# Patient Record
Sex: MALE | Race: WHITE | HISPANIC OR LATINO
[De-identification: names, ages, dates, MRNs, and addresses within clinical notes are randomized per-mention and may not be internally consistent; named-entity substitution may affect disease eponyms.]

---

## 2017-03-02 ENCOUNTER — APPOINTMENT (OUTPATIENT)
Dept: GASTROENTEROLOGY | Facility: CLINIC | Age: 37
End: 2017-03-02

## 2017-03-02 VITALS
OXYGEN SATURATION: 99 % | TEMPERATURE: 98.7 F | HEIGHT: 74 IN | WEIGHT: 228 LBS | SYSTOLIC BLOOD PRESSURE: 120 MMHG | HEART RATE: 68 BPM | DIASTOLIC BLOOD PRESSURE: 70 MMHG | BODY MASS INDEX: 29.26 KG/M2

## 2017-03-02 DIAGNOSIS — Z86.19 PERSONAL HISTORY OF OTHER INFECTIOUS AND PARASITIC DISEASES: ICD-10-CM

## 2017-03-02 DIAGNOSIS — R07.9 CHEST PAIN, UNSPECIFIED: ICD-10-CM

## 2017-03-02 DIAGNOSIS — K21.9 GASTRO-ESOPHAGEAL REFLUX DISEASE W/OUT ESOPHAGITIS: ICD-10-CM

## 2017-03-02 DIAGNOSIS — R10.13 EPIGASTRIC PAIN: ICD-10-CM

## 2017-04-20 ENCOUNTER — APPOINTMENT (OUTPATIENT)
Dept: GASTROENTEROLOGY | Facility: CLINIC | Age: 37
End: 2017-04-20

## 2017-06-15 RX ORDER — PANTOPRAZOLE 40 MG/1
40 TABLET, DELAYED RELEASE ORAL TWICE DAILY
Qty: 60 | Refills: 10 | Status: ACTIVE | COMMUNITY
Start: 2017-03-02 | End: 1900-01-01

## 2020-12-03 ENCOUNTER — EMERGENCY (EMERGENCY)
Facility: HOSPITAL | Age: 40
LOS: 1 days | Discharge: HOME | End: 2020-12-03
Attending: EMERGENCY MEDICINE | Admitting: EMERGENCY MEDICINE
Payer: COMMERCIAL

## 2020-12-03 VITALS
RESPIRATION RATE: 18 BRPM | DIASTOLIC BLOOD PRESSURE: 90 MMHG | HEART RATE: 86 BPM | SYSTOLIC BLOOD PRESSURE: 141 MMHG | TEMPERATURE: 98 F | WEIGHT: 225.09 LBS | HEIGHT: 74 IN | OXYGEN SATURATION: 98 %

## 2020-12-03 DIAGNOSIS — Z20.828 CONTACT WITH AND (SUSPECTED) EXPOSURE TO OTHER VIRAL COMMUNICABLE DISEASES: ICD-10-CM

## 2020-12-03 PROCEDURE — U0003: CPT

## 2020-12-03 PROCEDURE — 99283 EMERGENCY DEPT VISIT LOW MDM: CPT

## 2020-12-03 NOTE — ED PROVIDER NOTE - CONSTITUTIONAL NEGATIVE STATEMENT, MLM
+nausea. No vomiting. Last emesis was last night. Dad reports urgent care checked pt's urine   no fever and no chills.

## 2020-12-03 NOTE — ED ADULT NURSE NOTE - NSIMPLEMENTINTERV_GEN_ALL_ED
Implemented All Universal Safety Interventions:  [place] to call system. Call bell, personal items and telephone within reach. Instruct patient to call for assistance. Room bathroom lighting operational. Non-slip footwear when patient is off stretcher. Physically safe environment: no spills, clutter or unnecessary equipment. Stretcher in lowest position, wheels locked, appropriate side rails in place.

## 2020-12-03 NOTE — ED PROVIDER NOTE - PATIENT PORTAL LINK FT
You can access the FollowMyHealth Patient Portal offered by [facility] by registering at the following website: [email or web address]. By joining [employer]’s FollowMyHealth portal, you will also be able to view your health information using other applications (apps) compatible with our system.

## 2020-12-03 NOTE — ED PROVIDER NOTE - CLINICAL SUMMARY MEDICAL DECISION MAKING FREE TEXT BOX
pt presented for COVID testing and had no symptoms, pt was tested and given instruction about covid testin

## 2020-12-03 NOTE — ED ADULT TRIAGE NOTE - CHIEF COMPLAINT QUOTE
Patient presents to ED requesting covid swab because his father is experiencing symptoms (father also triaged at this time.)

## 2020-12-03 NOTE — ED PROVIDER NOTE - OBJECTIVE STATEMENT
41 y/o male with no sig PMHX presents to ED requesting COVID testing as his father has emigrated for other country few days ago and now he has cough and fever and he thinks his father may have COVID and wants him and his father both tested for COVID, he denies any symptoms

## 2020-12-03 NOTE — ED PROVIDER NOTE - NSFOLLOWUPINSTRUCTIONS_ED_ALL_ED_FT
1. You were seen in the ED and underwent testing for the novel coronarvirus (COVID-19). The results are not back yet and you will be contacted with the result in 5-7 days, but may take longer. You were also tested for other common viruses such as the flu and cold viruses. You will be notified if you test positive for any of these.    2. Until your test results are back, YOU MUST SELF-QUARANTINE until you are told to other otherwise by [facility] or the local [facility] department. This is extremely important to limit the spread of this virus. Please refer closely to the packet provided to you on the specifics of the process of self-quarantine.    3. If you end up testing positive for the virus, you will instructed as to when you can return to your usual activities. If you do not hear from anyone in 7 days, call [phone].     4. Return to the ED for difficulty breathing.    5. You may take over the counter acetaminophen (Tylenol) 650mg every 6 hours as needed for fever or pain. There is some concern in the medical community about using ibuprofen (Advil, Motrin) and other NSAIDs in people with COVID infections and until there is more research on this subject it may be best to avoid NSAIDs like ibuprofen, unless you have an allergy to acetaminophen (Tylenol).  Do NOT exceed 3500mg acetaminophen in 24 hours.  Please do not take these medications if you do not have pain or fever or if you have any history of liver disease.     -------------    What is a coronavirus?  Coronaviruses are a large family of viruses that cause illnesses ranging from the common cold to more severe diseases such as Middle East Respiratory Syndrome (MERS) and Severe Acute Respiratory Syndrome (SARS).    What is Novel Coronavirus (COVID-19)?  The Centers for Disease Control and Prevention (CDC) is closely monitoring the outbreak caused by COVID-19. For the latest information about COVID-19, visit the CDC website at CDC.gov/Coronavirus    How are coronaviruses spread?  Coronaviruses can be transmitted from person-toperson, usually after close contact with an infected  person (for example, in a household, workplace, or healthcare setting), via droplets that become airborne after a cough or sneeze. These droplets can then infect a nearby person. Transmission can also occur by touching recently contaminated surfaces.    Is there a treatment for a COVID-19?  There is no specific treatment for disease caused by COVID-19. However, many of the symptoms can be treated based on the patient’s clinical condition. Supportive care for infected persons can be highly effective.    What are the symptoms of coronavirus infection?  It depends on the virus, but common signs include fever and/or respiratory symptoms such as cough and shortness of breath. In more severe cases, infection can cause pneumonia, severe acute respiratory syndrome, kidney failure and even death. Fortunately, most cases of COVID-19 have an illness no different than the influenza (flu), with a majority of these patients having mild symptoms and overall mortality which appears to be not much different than the flu.    What can I do to protect myself?  The best precautionary measures:  – washing your hands  – covering your cough  – disinfecting surfaces  – it is also advisable to avoid close contact with anyone showing symptoms of respiratory illness such as coughing and sneezing  – those with symptoms should wear a surgical mask when around others    What can I do to protect those around me?  If you have been identified as someone who may be infected with COVID-19, we recommend you follow the self-isolation procedures outlined on the following page to protect those around you and to limit the spread of this virus.    We recommend the below precautionary steps from now until 14 days from when you returned from your travel or date of your last known possible contact:    — Do not go to work, school or public areas. Avoid using public transportation, ridesharing or taxis.  — As much as possible, separate yourself from other people in your home. If you can, you should stay in a room and away from other people. Also, you should use a separate bathroom if available.  — Wear the supplied mask whenever you are around other people.  — If you have a non-urgent medical appointment, please reschedule for a later date. If the appointment is urgent, please call the health care provider and tell them that you are on self-isolation for possible COVID-19. This will help the health care provider’s office take steps to keep other people from getting infected or exposed. If you can reschedule routine appointments, do so.  — Wash your hands often with soap and water for at least 15 to 20 seconds or clean your hands with an alcohol-based hand  that contains 60 to 95% alcohol, covering all surfaces of your hands and rubbing them together until they feel dry. Soap and water should be used preferentially if hands are visibly dirty.  — Cover your mouth and nose with a tissue when you cough or sneeze. Throw used tissues in a lined trash can. Immediately wash your hands.  — Avoid touching your eyes, nose, and mouth with your hands.  — Avoid sharing personal household items. You should not share dishes, drinking glasses, cups, eating utensils, towels, or bedding with other people or pets in your home. After using these items, they should be washed thoroughly with soap and water.  — Clean and disinfect all “high-touch” surfaces every day. High touch surfaces include counters, tabletops, doorknobs, light switches, remote controls, bathroom fixtures, toilets, phones, keyboards, tablets, and bedside tables. Also, clean any surfaces that may have blood, stool, or body fluids on them.    ------------------------------------------  Information for patients who have received a COVID-19 test.    The COVID-19 (novel coronavirus) test  Results may take up to 5-7 days to become available.      If your result is positive, you will receive a phone call from one of our coronavirus specialists. While we will do our best to also call patients with a negative test result, the sheer volume of tests being performed may make this difficult to do in a timely fashion. If you haven’t heard from us within 5 days and you’d like to check on your results, you can check our [employer] joe or call one of our coronavirus specialists at [address] [place] [address] (available 24/7)    Please DO NOT call the site where you received the test to obtain your results.    If the test is positive -   You will continue home isolation until you are completely well, you have no fever, and it has been at least 14 days since your positive test. The health department in your city or county may also contact you with additional instructions.    If your test is negative -    You will be able to stop home isolation and resume standard precautions, similiar to how you would manage the common cold or flu.  If you have any questions, you can reach out to one of our coronavirus specialists  at [address] [place] [address].    REMEMBER - a negative COVID test means you were negative AT THE TIME OF TESTING, and it is possible to have contracted COVID after being tested.        CORONAVIRUS DISCHARGE INSTRUCTIONS  COVID-19 (Coronavirus Disease 2019)    WHAT YOU NEED TO KNOW:    COVID-19 is the disease caused by the 2019 novel (new) coronavirus. It was first found in individuals in a part of [place] in late 2019. The virus is spreading to other countries as infected persons travel. Coronaviruses generally cause respiratory (nose, throat, and lung) infections, such as a cold. They can also cause serious infections such as Middle East respiratory syndrome (MERS) and severe acute respiratory syndrome (SARS). The new virus is related to the SARS coronavirus, so it is officially named SARS coronavirus 2 (SARS-CoV-2).    DISCHARGE INSTRUCTIONS:    If you think you or someone you know may be infected: It is important for anyone who may be infected to get tested right away. Do the following to protect others:     If emergency care is needed, tell the  about the possible infection, or call ahead and tell the emergency department.      Call a healthcare provider to be seen in the office. Anyone who may be infected should not arrive without calling first. The provider will need to protect staff members and other patients.      The person who may be infected needs to wear a medical mask before getting medical care. The mask needs to stay on until the provider says to remove it.    Call your local emergency number (911 in the ) or go to the emergency department if: You have signs or symptoms of COVID-19, and any of the following is true:     You traveled within the last 14 days to an area where the virus is active or had close contact with someone who did.      You had close contact within the last 14 days with someone who has a confirmed infection.    Call your doctor if: You do not have signs or symptoms of COVID-19, but any of the following is true:     You traveled within the last 14 days to an area where the virus is active or had close contact with someone who did.      You had close contact within the last 14 days with someone who has a confirmed infection.      You have questions or concerns about your condition or care.    Medicines: You may need any of the following for mild symptoms:     Decongestants help reduce nasal congestion and help you breathe more easily. If you take decongestant pills, they may make you feel restless or cause problems with your sleep. Do not use decongestant sprays for more than a few days.      Cough suppressants help reduce coughing. Ask your healthcare provider which type of cough medicine is best for you.      Acetaminophen decreases pain and fever. It is available without a doctor's order. Ask how much to take and how often to take it. Follow directions. Read the labels of all other medicines you are using to see if they also contain acetaminophen, or ask your doctor or pharmacist. Acetaminophen can cause liver damage if not taken correctly. Do not use more than 4 grams (4,000 milligrams) total of acetaminophen in one day.     Take your medicine as directed. Contact your healthcare provider if you think your medicine is not helping or if you have side effects. Tell him or her if you are allergic to any medicine. Keep a list of the medicines, vitamins, and herbs you take. Include the amounts, and when and why you take them. Bring the list or the pill bottles to follow-up visits. Carry your medicine list with you in case of an emergency.    How the 2019 coronavirus spreads: The virus appears to spread quickly and easily. The following are ways the virus is thought to spread, but more information may be coming:     Droplets are the most common way all coronaviruses spread. The virus can travel in droplets that form when a person talks, coughs, or sneezes. Anyone who breathes in the virus or gets it in his or her eyes can become infected.      An infected person may be able to leave the virus on objects and surfaces. Another person can get the virus on his or her hands by touching the object or surface. Infection happens if the person then touches his or her eyes or mouth with unwashed hands. It is not yet known how long the virus can stay on an object or surface. Evidence shows it may be as long as 9 days at room temperature.      Person-to-person contact may spread the virus. For example, an infected person can spread the virus by shaking hands with someone. At this time, it does not appear that the virus can be passed to a baby during pregnancy or delivery. The virus also does not appear to spread during breastfeeding. If you are pregnant or breastfeeding, talk to your healthcare provider or obstetrician about any concerns you have.      An infected animal may be able to infect a person who touches it. This may happen at live markets or on a farm.    Prevent a 2019 coronavirus infection: Everyone should do the following to prevent getting or spreading the virus:     Wash your hands often. Use soap and water every time you wash your hands. Rub your soapy hands together, lacing your fingers. Use the fingers of one hand to scrub under the nails of the other hand. Wash for at least 20 seconds. Rinse with warm, running water for several seconds. Then dry your hands. Use germ-killing gel if soap and water are not available. Do not touch your eyes or mouth without washing your hands first. Handwashing           Cover a sneeze or cough. Use a tissue that covers your mouth and nose. Throw the tissue away in a trash can right away. Use the bend of your arm if a tissue is not available. Wash your hands well with soap and water or use a hand . Do not stand close to anyone who is sneezing or coughing.      Be careful around others. The best way to prevent infection is to avoid anyone who is infected, but this may be difficult. An infected person may be able to spread the virus before signs or symptoms begin. Until more is known, you may not want to shake hands with others. If you do shake hands, wash your hands or use hand  as soon as possible. You do not need to wear a medical mask if you are well and not caring for an infected person.      Ask about vaccines you may need. No vaccine is available for the new coronavirus. But any infection can affect your immune system. A weakened immune system makes you more vulnerable to the new coronavirus. Until a vaccine against the new virus is developed, do the following:   Get a flu vaccine as soon as recommended each year. The flu vaccine is available starting in September or October. Flu viruses change, so it is important to get a flu vaccine every year.      Talk to your healthcare provider about your vaccine history. Tell him or her if you did not get certain vaccines as a child, or you did not get all recommended doses. Tell him or her if you do not know your vaccine history. He or she will tell you which vaccines you need, and when to get them.           If you have COVID-19: Healthcare providers will give you specific instructions to follow. The following are general guidelines to remind you of how to keep others safe until you are well:     Limit close contact with others. Your healthcare provider will tell you when it is okay to be around others. This may be when you do not have a fever, do not take fever medicine, and have no symptoms. Fluid from your respiratory tract will need to test negative for the virus 2 times at least 24 hours apart. Until then, do the following along with any instructions from your provider:   Only go out of the house for medical appointments. Always call the provider’s office first so he or she can prepare to keep others safe.      Stay at least 6 feet (2 meters) away from others.      Sleep in a different room from others in the house.      Do not shake hands with other people.      Wear a medical mask when others are near you. This can help prevent droplets from spreading the virus when you talk, sneeze, or cough.      Do not share items. Do not share dishes, towels, or other items with anyone. Items need to be washed after you use them.      Do not handle live animals. The virus may be spread to animals, including pets. Until more is known, it is best not to touch, play with, or handle live animals.    Self-care:     Drink more liquids as directed. Liquids will help thin and loosen mucus so you can cough it up. Liquids will also help prevent dehydration. Liquids that help prevent dehydration include water, fruit juice, and broth. Do not drink liquids that contain caffeine. Caffeine can increase your risk for dehydration. Ask your healthcare provider how much liquid to drink each day.      Soothe a sore throat. Gargle with warm salt water. This may help your sore throat feel better. Make salt water by dissolving ¼ teaspoon salt in 1 cup warm water. You may also suck on hard candy or throat lozenges. You may use a sore throat spray.      Use a humidifier or vaporizer. Use a cool mist humidifier or a vaporizer to increase air moisture in your home. This may make it easier for you to breathe and help decrease your cough.      Use saline nasal drops as directed. These help relieve congestion.      Apply petroleum-based jelly around the outside of your nostrils. This can decrease irritation from blowing your nose.      Do not smoke. Nicotine and other chemicals in cigarettes and cigars can make your symptoms worse. They can also cause infections such as bronchitis or pneumonia. Ask your healthcare provider for information if you currently smoke and need help to quit. E-cigarettes or smokeless tobacco still contain nicotine. Talk to your healthcare provider before you use these products.    If you take care of someone who has COVID-19:     Wear a medical mask when you are near the person. This can help protect you from droplets that carry the virus when the person talks, sneezes, or coughs.      Do not allow others to go near the person. No one should come to the person's home unless it is necessary. Keep the room's door shut unless you need to go in or out.      Make sure the person's room has good air flow. You may be able to open the window if the weather allows. An air conditioner can also be turned on to help air move.      Clean items the person uses or touches. Wear disposable gloves to handle the person's laundry. Place the laundry in a plastic bag. Use hot water and soap to wash the person's laundry. Wash eating utensils and other items after the person uses them. Throw your gloves away after you use them.      Clean surfaces often. Use bleach diluted with water or disinfecting wipes. Clean counters, doorknobs, toilet seats, and other surfaces.    Follow up with your doctor as directed: Write down your questions so you remember to ask them during your visits.    For more information:     Centers for Disease Control and Prevention  [address], [place]  Phone: 7-007-4006943  Phone: 0-480-0759935  Web Address: http://www.cdc.gov

## 2020-12-04 LAB — SARS-COV-2 RNA SPEC QL NAA+PROBE: SIGNIFICANT CHANGE UP

## 2020-12-07 ENCOUNTER — TRANSCRIPTION ENCOUNTER (OUTPATIENT)
Age: 40
End: 2020-12-07

## 2021-04-03 ENCOUNTER — TRANSCRIPTION ENCOUNTER (OUTPATIENT)
Age: 41
End: 2021-04-03

## 2021-04-03 ENCOUNTER — EMERGENCY (EMERGENCY)
Facility: HOSPITAL | Age: 41
LOS: 1 days | End: 2021-04-03
Attending: INTERNAL MEDICINE | Admitting: HOSPITALIST
Payer: COMMERCIAL

## 2021-04-03 VITALS
DIASTOLIC BLOOD PRESSURE: 82 MMHG | TEMPERATURE: 97 F | RESPIRATION RATE: 18 BRPM | OXYGEN SATURATION: 99 % | HEART RATE: 61 BPM | SYSTOLIC BLOOD PRESSURE: 126 MMHG | WEIGHT: 229.94 LBS | HEIGHT: 74 IN

## 2021-04-03 VITALS
HEART RATE: 75 BPM | TEMPERATURE: 98 F | OXYGEN SATURATION: 97 % | DIASTOLIC BLOOD PRESSURE: 84 MMHG | SYSTOLIC BLOOD PRESSURE: 127 MMHG | RESPIRATION RATE: 18 BRPM

## 2021-04-03 LAB
ALBUMIN SERPL ELPH-MCNC: 3.4 G/DL — SIGNIFICANT CHANGE UP (ref 3.3–5)
ALP SERPL-CCNC: 86 U/L — SIGNIFICANT CHANGE UP (ref 40–120)
ALT FLD-CCNC: 61 U/L — HIGH (ref 10–45)
ANION GAP SERPL CALC-SCNC: 10 MMOL/L — SIGNIFICANT CHANGE UP (ref 5–17)
AST SERPL-CCNC: 20 U/L — SIGNIFICANT CHANGE UP (ref 10–40)
BASOPHILS # BLD AUTO: 0.08 K/UL — SIGNIFICANT CHANGE UP (ref 0–0.2)
BASOPHILS NFR BLD AUTO: 0.8 % — SIGNIFICANT CHANGE UP (ref 0–2)
BILIRUB SERPL-MCNC: 0.4 MG/DL — SIGNIFICANT CHANGE UP (ref 0.2–1.2)
BUN SERPL-MCNC: 13 MG/DL — SIGNIFICANT CHANGE UP (ref 7–23)
CALCIUM SERPL-MCNC: 8.6 MG/DL — SIGNIFICANT CHANGE UP (ref 8.4–10.5)
CHLORIDE SERPL-SCNC: 105 MMOL/L — SIGNIFICANT CHANGE UP (ref 96–108)
CK SERPL-CCNC: 116 U/L — SIGNIFICANT CHANGE UP (ref 30–200)
CK SERPL-CCNC: 121 U/L — SIGNIFICANT CHANGE UP (ref 30–200)
CO2 SERPL-SCNC: 24 MMOL/L — SIGNIFICANT CHANGE UP (ref 22–31)
CREAT SERPL-MCNC: 1.25 MG/DL — SIGNIFICANT CHANGE UP (ref 0.5–1.3)
D DIMER BLD IA.RAPID-MCNC: 156 NG/ML DDU — SIGNIFICANT CHANGE UP
EOSINOPHIL # BLD AUTO: 0.26 K/UL — SIGNIFICANT CHANGE UP (ref 0–0.5)
EOSINOPHIL NFR BLD AUTO: 2.5 % — SIGNIFICANT CHANGE UP (ref 0–6)
GLUCOSE SERPL-MCNC: 114 MG/DL — HIGH (ref 70–99)
HCT VFR BLD CALC: 46.7 % — SIGNIFICANT CHANGE UP (ref 39–50)
HGB BLD-MCNC: 15.8 G/DL — SIGNIFICANT CHANGE UP (ref 13–17)
IMM GRANULOCYTES NFR BLD AUTO: 0.5 % — SIGNIFICANT CHANGE UP (ref 0–1.5)
LYMPHOCYTES # BLD AUTO: 3.54 K/UL — HIGH (ref 1–3.3)
LYMPHOCYTES # BLD AUTO: 33.6 % — SIGNIFICANT CHANGE UP (ref 13–44)
MCHC RBC-ENTMCNC: 30.2 PG — SIGNIFICANT CHANGE UP (ref 27–34)
MCHC RBC-ENTMCNC: 33.8 GM/DL — SIGNIFICANT CHANGE UP (ref 32–36)
MCV RBC AUTO: 89.1 FL — SIGNIFICANT CHANGE UP (ref 80–100)
MONOCYTES # BLD AUTO: 0.78 K/UL — SIGNIFICANT CHANGE UP (ref 0–0.9)
MONOCYTES NFR BLD AUTO: 7.4 % — SIGNIFICANT CHANGE UP (ref 2–14)
NEUTROPHILS # BLD AUTO: 5.83 K/UL — SIGNIFICANT CHANGE UP (ref 1.8–7.4)
NEUTROPHILS NFR BLD AUTO: 55.2 % — SIGNIFICANT CHANGE UP (ref 43–77)
NRBC # BLD: 0 /100 WBCS — SIGNIFICANT CHANGE UP (ref 0–0)
PLATELET # BLD AUTO: 286 K/UL — SIGNIFICANT CHANGE UP (ref 150–400)
POTASSIUM SERPL-MCNC: 4 MMOL/L — SIGNIFICANT CHANGE UP (ref 3.5–5.3)
POTASSIUM SERPL-SCNC: 4 MMOL/L — SIGNIFICANT CHANGE UP (ref 3.5–5.3)
PROT SERPL-MCNC: 6.9 G/DL — SIGNIFICANT CHANGE UP (ref 6–8.3)
RBC # BLD: 5.24 M/UL — SIGNIFICANT CHANGE UP (ref 4.2–5.8)
RBC # FLD: 12.8 % — SIGNIFICANT CHANGE UP (ref 10.3–14.5)
SARS-COV-2 RNA SPEC QL NAA+PROBE: SIGNIFICANT CHANGE UP
SODIUM SERPL-SCNC: 139 MMOL/L — SIGNIFICANT CHANGE UP (ref 135–145)
TROPONIN I SERPL-MCNC: <.017 NG/ML — LOW (ref 0.02–0.06)
WBC # BLD: 10.54 K/UL — HIGH (ref 3.8–10.5)
WBC # FLD AUTO: 10.54 K/UL — HIGH (ref 3.8–10.5)

## 2021-04-03 PROCEDURE — 85379 FIBRIN DEGRADATION QUANT: CPT

## 2021-04-03 PROCEDURE — 71275 CT ANGIOGRAPHY CHEST: CPT

## 2021-04-03 PROCEDURE — 96361 HYDRATE IV INFUSION ADD-ON: CPT

## 2021-04-03 PROCEDURE — 93306 TTE W/DOPPLER COMPLETE: CPT

## 2021-04-03 PROCEDURE — 71045 X-RAY EXAM CHEST 1 VIEW: CPT

## 2021-04-03 PROCEDURE — 99218: CPT

## 2021-04-03 PROCEDURE — G0378: CPT

## 2021-04-03 PROCEDURE — 74174 CTA ABD&PLVS W/CONTRAST: CPT | Mod: 26,MA

## 2021-04-03 PROCEDURE — 85025 COMPLETE CBC W/AUTO DIFF WBC: CPT

## 2021-04-03 PROCEDURE — 93010 ELECTROCARDIOGRAM REPORT: CPT | Mod: 76

## 2021-04-03 PROCEDURE — 71045 X-RAY EXAM CHEST 1 VIEW: CPT | Mod: 26

## 2021-04-03 PROCEDURE — 99285 EMERGENCY DEPT VISIT HI MDM: CPT

## 2021-04-03 PROCEDURE — 99219: CPT

## 2021-04-03 PROCEDURE — 36415 COLL VENOUS BLD VENIPUNCTURE: CPT

## 2021-04-03 PROCEDURE — 71275 CT ANGIOGRAPHY CHEST: CPT | Mod: 26,MA

## 2021-04-03 PROCEDURE — 96374 THER/PROPH/DIAG INJ IV PUSH: CPT | Mod: XU

## 2021-04-03 PROCEDURE — 93005 ELECTROCARDIOGRAM TRACING: CPT

## 2021-04-03 PROCEDURE — 99284 EMERGENCY DEPT VISIT MOD MDM: CPT | Mod: 25

## 2021-04-03 PROCEDURE — 82550 ASSAY OF CK (CPK): CPT

## 2021-04-03 PROCEDURE — 80053 COMPREHEN METABOLIC PANEL: CPT

## 2021-04-03 PROCEDURE — 84484 ASSAY OF TROPONIN QUANT: CPT

## 2021-04-03 PROCEDURE — 74174 CTA ABD&PLVS W/CONTRAST: CPT

## 2021-04-03 PROCEDURE — 93306 TTE W/DOPPLER COMPLETE: CPT | Mod: 26

## 2021-04-03 PROCEDURE — 87635 SARS-COV-2 COVID-19 AMP PRB: CPT

## 2021-04-03 RX ORDER — PANTOPRAZOLE SODIUM 20 MG/1
40 TABLET, DELAYED RELEASE ORAL
Refills: 0 | Status: DISCONTINUED | OUTPATIENT
Start: 2021-04-03 | End: 2021-04-06

## 2021-04-03 RX ORDER — ACETAMINOPHEN 500 MG
650 TABLET ORAL EVERY 6 HOURS
Refills: 0 | Status: DISCONTINUED | OUTPATIENT
Start: 2021-04-03 | End: 2021-04-06

## 2021-04-03 RX ORDER — ASPIRIN/CALCIUM CARB/MAGNESIUM 324 MG
162 TABLET ORAL ONCE
Refills: 0 | Status: COMPLETED | OUTPATIENT
Start: 2021-04-03 | End: 2021-04-03

## 2021-04-03 RX ORDER — ASPIRIN/CALCIUM CARB/MAGNESIUM 324 MG
81 TABLET ORAL DAILY
Refills: 0 | Status: DISCONTINUED | OUTPATIENT
Start: 2021-04-03 | End: 2021-04-06

## 2021-04-03 RX ORDER — MORPHINE SULFATE 50 MG/1
2 CAPSULE, EXTENDED RELEASE ORAL ONCE
Refills: 0 | Status: DISCONTINUED | OUTPATIENT
Start: 2021-04-03 | End: 2021-04-03

## 2021-04-03 RX ORDER — SODIUM CHLORIDE 9 MG/ML
1000 INJECTION INTRAMUSCULAR; INTRAVENOUS; SUBCUTANEOUS
Refills: 0 | Status: COMPLETED | OUTPATIENT
Start: 2021-04-03 | End: 2021-04-03

## 2021-04-03 RX ADMIN — Medication 650 MILLIGRAM(S): at 08:03

## 2021-04-03 RX ADMIN — MORPHINE SULFATE 2 MILLIGRAM(S): 50 CAPSULE, EXTENDED RELEASE ORAL at 04:42

## 2021-04-03 RX ADMIN — MORPHINE SULFATE 2 MILLIGRAM(S): 50 CAPSULE, EXTENDED RELEASE ORAL at 04:57

## 2021-04-03 RX ADMIN — SODIUM CHLORIDE 1000 MILLILITER(S): 9 INJECTION INTRAMUSCULAR; INTRAVENOUS; SUBCUTANEOUS at 06:00

## 2021-04-03 RX ADMIN — Medication 81 MILLIGRAM(S): at 07:55

## 2021-04-03 RX ADMIN — PANTOPRAZOLE SODIUM 40 MILLIGRAM(S): 20 TABLET, DELAYED RELEASE ORAL at 07:55

## 2021-04-03 RX ADMIN — Medication 162 MILLIGRAM(S): at 04:42

## 2021-04-03 RX ADMIN — SODIUM CHLORIDE 1000 MILLILITER(S): 9 INJECTION INTRAMUSCULAR; INTRAVENOUS; SUBCUTANEOUS at 05:00

## 2021-04-03 NOTE — CONSULT NOTE ADULT - SUBJECTIVE AND OBJECTIVE BOX
[facility] Consultation    CHIEF COMPLAINT: Patient is a 40y old  Male who presents with a chief complaint of chest pain (03 Apr 2021 06:04)  asked to see patient regarding chest pain   chart is reviewed... patient examined  history from patient... good reliability     HPI:  The patient is a 40 year old man, presents to hospital with complaints of chest pain.  Started yesterday afternoon... initially mild intensity but during the course of the day, it intensified. He occasionally had feeling that it radiated to his back, but this was not persistent or consistent. He did feel that chest pain worsened if he pressed on his chest.  He was able to get to sleep but awoke with same symptoms... came to ER.  In ER, he was given morphine, and this did help   He is lying comfortably in bed, no distress, but does have mild chest pain       PAST MEDICAL & SURGICAL HISTORY:  Bicuspid aortic valve... diagnosed about 10 years ago.  He follows regularly with a cardiologist in [place]... he has regular echo and stress test.  Does not recall ever having a CT chest     No history of CAD, MI, CHF, arrhythmia.  No HTN, DM, vascular disease.       SOCIAL HISTORY:  non smoker, no alcohol, , 3 children, works at a [employer] company    FAMILY HISTORY:  negative for bicuspid AV or aortopathies   FH: hyperlipidemia    Family history of brain aneurysm (Grandparent)    FHx: diabetes mellitus    MEDICATIONS  (STANDING):  aspirin enteric coated 81 milliGRAM(s) Oral daily  pantoprazole    Tablet 40 milliGRAM(s) Oral before breakfast    MEDICATIONS  (PRN):  acetaminophen   Tablet .. 650 milliGRAM(s) Oral every 6 hours PRN Temp greater or equal to 38C (100.4F), Mild Pain (1 - 3)      Allergies    penicillin (Swelling; Rash)    Intolerances        REVIEW OF SYSTEMS:    CONSTITUTIONAL: No weakness, fevers or chills  EYES: No visual changes, No diplopia  ENMT: No throat pain , No exudate  NECK: No pain or stiffness  RESPIRATORY: No cough, wheezing, hemoptysis; No shortness of breath  CARDIOVASCULAR:    No shortness of breath or dyspnea on exertion.  No palpitations, dizziness, light headedness, syncope or near syncope.  No edema, no orthopnea.   GASTROINTESTINAL: No abdominal pain. No nausea, vomiting, or hematemesis; No diarrhea or constipation. No melena or hematochezia.  GENITOURINARY: No dysuria, frequency or hematuria  NEUROLOGICAL: No numbness or weakness  SKIN: No itching or rash  All other review of systems is negative unless indicated above    VITAL SIGNS:   Vital Signs Last 24 Hrs  T(C): 36.4 (03 Apr 2021 08:00), Max: 36.4 (03 Apr 2021 08:00)  T(F): 97.5 (03 Apr 2021 08:00), Max: 97.5 (03 Apr 2021 08:00)  HR: 57 (03 Apr 2021 08:00) (57 - 63)  BP: right arm 122/78, left arm 119/77  BP(mean): 85 (03 Apr 2021 08:00) (85 - 85)  RR: 18 (03 Apr 2021 08:00) (18 - 18)  SpO2: 100% (03 Apr 2021 08:00) (98% - 100%)    I&O's Summary      PHYSICAL EXAM:    Constitutional: NAD, awake and alert, well-developed  Eyes:  EOMI,  Pupils round, no lesions  ENMT: no exudate or erythema  Pulmonary: Non-labored, breath sounds are clear bilaterally, No wheezing, rales or rhonchi  Cardiovascular: PMI not palpable non-displaced Regular S1 and S2 l/Vl systolic murmur   Gastrointestinal: Bowel Sounds present, soft, nontender.   Lymph: No peripheral edema. No cervical lymphadenopathy.  Neurological: Alert, no focal deficits  Skin: No rashes. Changes of chronic venous stasis. No cyanosis.  Psych:  Mood & affect appropriate    LABS: All Labs Reviewed:                        15.8   10.54 )-----------( 286      ( 03 Apr 2021 04:25 )             46.7     03 Apr 2021 04:25    139    |  105    |  13     ----------------------------<  114    4.0     |  24     |  1.25     Ca    8.6        03 Apr 2021 04:25    TPro  6.9    /  Alb  3.4    /  TBili  0.4    /  DBili  x      /  AST  20     /  ALT  61     /  AlkPhos  86     03 Apr 2021 04:25      CARDIAC MARKERS ( 03 Apr 2021 04:25 )  <.017 ng/mL / x     / x     / x     / x        EKG:  < from: 12 Lead ECG (04.03.21 @ 04:14) >   Normal sinus rhythm  Normal ECG  No previous ECGs available    < end of copied text >

## 2021-04-03 NOTE — DISCHARGE NOTE NURSING/CASE MANAGEMENT/SOCIAL WORK - NSDCVIVACCINE_GEN_ALL_CORE_FT
Urine culture positive, she was sent home yesterday on bactrim by Dr. Ray-which is sensitive.       No Vaccines Administered.

## 2021-04-03 NOTE — ED ADULT NURSE NOTE - OBJECTIVE STATEMENT
Pt comes in complaining of chest pressure that woke him up around 0230. Pt states he started feeling some pressure yesterday. Pt also complains of intermittent dizziness but since arriving to ED, he has not had that happen. No other symptoms. No SOB, no fever, chills, n/v/d.

## 2021-04-03 NOTE — H&P ADULT - NSHPPHYSICALEXAM_GEN_ALL_CORE
Vital Signs Last 24 Hrs  T(C): 36.2 (03 Apr 2021 04:05), Max: 36.2 (03 Apr 2021 04:05)  T(F): 97.2 (03 Apr 2021 04:05), Max: 97.2 (03 Apr 2021 04:05)  HR: 61 (03 Apr 2021 04:05) (61 - 61)  BP: 126/82 (03 Apr 2021 04:05) (126/82 - 126/82)  BP(mean): --  RR: 18 (03 Apr 2021 04:05) (18 - 18)  SpO2: 99% (03 Apr 2021 04:05) (99% - 99%)  Daily Height in cm: 187.96 (03 Apr 2021 04:05)    Daily   CAPILLARY BLOOD GLUCOSE        I&O's Summary      GENERAL: NAD  HEAD:  Normocephalic  EYES: EOMI, PERRLA, conjunctiva and sclera clear  ENMT: No tonsillar erythema, exudates, or enlargement; Moist mucous membranes, No lesions  NECK: Supple, No JVD, no bruit, normal thyroid  NERVOUS SYSTEM:  Alert & Oriented X3, Good concentration; grossly  Motor Strength 5/5 B/L upper and lower extremities; DTRs 2+ intact and symmetric  CHEST/LUNG: Clear to auscultation bilaterally; No rales, rhonchi, wheezing, or rubs. + L sided chest wall tenderness and L lower ribs reproducible of sxs.   HEART: Regular rate and rhythm; No murmurs, rubs, or gallops  ABDOMEN: Soft, Nontender, Nondistended; Bowel sounds present  EXTREMITIES:  2+ Peripheral Pulses, No clubbing, cyanosis, or edema  LYMPH: No lymphadenopathy noted  SKIN: No rashes or lesions

## 2021-04-03 NOTE — H&P ADULT - HISTORY OF PRESENT ILLNESS
40M hx of bicuspid aortic valve, borderline HLD (not on meds) pw chest pain. He developed L sided chest pain yesterday afternoon around 3PM associated with fatigue. Denied any associated HA, dizziness, SOB, palps, diaphoresis or nausea. L sided chest pain was persistent throughout the evening and to the night. Woke up at 230Am with worsening chest pain radiating around the L chest to the back associated with L arm numbness and tingling, worsened with positioning. He denied any recent fevers, chills, cough, NVD, dysuria.   In ED, afebrile hemodynamically stable and asa and s/p IV morphine with near complete relief of sxs.

## 2021-04-03 NOTE — H&P ADULT - NSHPREVIEWOFSYSTEMS_GEN_ALL_CORE
CONSTITUTIONAL: No fever, weight loss, ++fatigue  EYES: No eye pain, visual disturbances, or discharge  ENMT:  No difficulty hearing, tinnitus, vertigo; No sinus or throat pain  NECK: No pain or stiffness  RESPIRATORY: No cough, wheezing, chills or hemoptysis; No shortness of breath  CARDIOVASCULAR: +chest pain, no palpitations, dizziness, or leg swelling  GASTROINTESTINAL: No abdominal or epigastric pain. No nausea, vomiting, or hematemesis; No diarrhea or constipation. No melena or hematochezia.  GENITOURINARY: No dysuria, frequency, hematuria, or incontinence  NEUROLOGICAL: No headaches, memory loss, loss of strength, numbness, or tremors  SKIN: No itching, burning, rashes, or lesions   LYMPH NODES: No enlarged glands  ENDOCRINE: No heat or cold intolerance; No hair loss  MUSCULOSKELETAL: No joint pain or swelling; No muscle, back, or extremity pain  PSYCHIATRIC: No depression, anxiety, mood swings, or difficulty sleeping  HEME/LYMPH: No easy bruising, or bleeding gums  ALLERY AND IMMUNOLOGIC: No hives or eczema    IMPROVE VTE Individual Risk Assessment          RISK                                                          Points  [  ] Previous VTE                                                3  [  ] Thrombophilia                                             2  [  ] Lower limb paralysis                                   2        (unable to hold up >15 seconds)    [  ] Current Cancer                                             2         (within 6 months)  [  ] Immobilization > 24 hrs                              1  [  ] ICU/CCU stay > 24 hours                             1  [  ] Age > 60                                                         1    IMPROVE VTE Score:         [        0 ]    Total Risk Factor Score:    0 - 1:   Consider IPC  >2 - 3:  Thromboprophylaxis required (enoxaparin or SQ heparin)        >4:   High Risk: Thromboprophylaxis required (enoxaparin or SQ heparin), optional add IPC  **If CONTRAINDICATION to enoxaparin or SQ heparin, USE IPCs**

## 2021-04-03 NOTE — DISCHARGE NOTE PROVIDER - CARE PROVIDER_API CALL
ANASTACIA ESTEVES  [id number]  [address] [address]  Phone: [phone]  Fax: [phone]  Established Patient  Follow Up Time: 1-3 Days

## 2021-04-03 NOTE — DISCHARGE NOTE NURSING/CASE MANAGEMENT/SOCIAL WORK - PATIENT PORTAL LINK FT
You can access the FollowMyHealth Patient Portal offered by [facility] by registering at the following website: [email or web address]. By joining [employer]’s FollowMyHealth portal, you will also be able to view your health information using other applications (apps) compatible with our system.

## 2021-04-03 NOTE — H&P ADULT - NSHPLABSRESULTS_GEN_ALL_CORE
15.8   10.54 )-----------( 286      ( 03 Apr 2021 04:25 )             46.7       04-03    139  |  105  |  13  ----------------------------<  114<H>  4.0   |  24  |  1.25    Ca    8.6      03 Apr 2021 04:25    TPro  6.9  /  Alb  3.4  /  TBili  0.4  /  DBili  x   /  AST  20  /  ALT  61<H>  /  AlkPhos  86  04-03         LIVER FUNCTIONS - ( 03 Apr 2021 04:25 )  Alb: 3.4 g/dL / Pro: 6.9 g/dL / ALK PHOS: 86 U/L / ALT: 61 U/L / AST: 20 U/L / GGT: x                   CARDIAC MARKERS ( 03 Apr 2021 04:25 )  <.017 ng/mL / x     / x     / x     / x                CAPILLARY BLOOD GLUCOSE        04-03 @ 04:30  156 ng/mL DDU      EKG: NSR at 63bpm, no acute st changes.      CXR: wet read NAPD

## 2021-04-03 NOTE — DISCHARGE NOTE PROVIDER - NSDCCPCAREPLAN_GEN_ALL_CORE_FT
PRINCIPAL DISCHARGE DIAGNOSIS  Diagnosis: Acute chest pain  Assessment and Plan of Treatment: Ruled out any concerning issues that can cause left sided chest pain, such acute MI, Aortic dissection which is a tear in the Aorta (large blood vessel in our body). Your test were negative overall. Your chest pain is likely muscular in etiology as it was reproducible with palpation. Recommend Tylenol, Ibuprofen as needed for pain control and follow with your primary cardiologist

## 2021-04-03 NOTE — ED ADULT NURSE NOTE - SUICIDE SCREENING QUESTION 2
Telephone Encounter by Rossana Melton RN, BSN at 02/28/18 05:08 PM     Author:  Rossana Mleton RN, BSN Service:  (none) Author Type:  Registered Nurse     Filed:  02/28/18 05:08 PM Encounter Date:  2/28/2018 Status:  Signed     :  Rossana Melton RN, BSN (Registered Nurse)            Pt already scheduled 3/8/18 with Dr Brizuela[SS1.1M]      Revision History        User Key Date/Time User Provider Type Action    > SS1.1 02/28/18 05:08 PM Rossana Melton, [place], BSN Registered Nurse Sign    M - Manual             No

## 2021-04-03 NOTE — H&P ADULT - ASSESSMENT
40M hx of bicuspid aortic valve, borderline HLD pw chest pain.    #Chest pain likely atypical. (Heart score 2). hx of bicuspid AV  trend trops, serial EKGs, ECHO, cardiology consult.   d-dimer neg  check lipid and HgA1c.   outpt stress test if cardiology agrees.

## 2021-04-03 NOTE — H&P ADULT - NSICDXFAMILYHX_GEN_ALL_CORE_FT
FAMILY HISTORY:  FH: hyperlipidemia  FHx: diabetes mellitus    Grandparent  Still living? Unknown  Family history of brain aneurysm, Age at diagnosis: Age Unknown

## 2021-04-03 NOTE — CONSULT NOTE ADULT - ASSESSMENT
40 year old man presents with complaints of chest pain with intermittent radiation of pain to his back.  He has history of bicuspid AV.  PE is unremarkable... no difference in BP in upper extremities   No acute EKG changes and initial cardiac troponin is negative  d dimer is negative    Plan  - check CTA aorta to rule out aortic dissection, as bicuspid AV does predispose to aortopathy  - continue to trend cardiac enzymes  - check echo  - further plans pending above    discussed with patient and with Dr. Chicas

## 2021-04-03 NOTE — ED PROVIDER NOTE - CLINICAL SUMMARY MEDICAL DECISION MAKING FREE TEXT BOX
40 y m cc L sided chest pain radiating to L UL associated with dizziness   ekg nsr at 63  cxr-  labs trops, d dimer 40 y m cc L sided chest pain radiating to L UL associated with dizziness   ekg nsr at 63  cxr-  labs trops, d dimer nl   plan obs admission

## 2021-04-03 NOTE — ED PROVIDER NOTE - OBJECTIVE STATEMENT
L chest pain L chest pain since 2 days progressively worse associated with tingling L UL dizziness tired  onset gradual   locations chest L sided   duration 2 days   characteristics pain since 2 days progressively worse associated with tingling L UL dizziness tired  context hx of bicuspid valve seen by cardiologist 1 y ago rx no meds seen by dr bam bautista [phone]  aggravating factors none  relieving factors none  timming constant   severity moderate 6/10

## 2021-04-03 NOTE — DISCHARGE NOTE PROVIDER - HOSPITAL COURSE
Hospital Course  40M hx of bicuspid aortic valve, borderline HLD (not on meds) pw chest pain, Ruled out any concerning issues that can cause left sided chest pain, such acute MI, Aortic dissection with negative Trops x 3, normal EKG, Echo normal and CTA Chest/Abd/pelvis. Chest pain is likely muscular in etiology as it was reproducible with palpation. Recommend Tylenol, Ibuprofen as needed for pain control and follow with primary cardiologist    Source of Infection:  Antibiotic / Last Day:    Palliative Care / Advanced Care Planning  Code Status:  Patient/Family agreeable to Hospice/Palliative (Y/N)?  Summary of Goals of Care Conversation:    Discharging Provider:  Bruno Chicas MD  Contact Info: [phone] - Please call with any questions or concerns.    Outpatient Provider: Dr. Wills

## 2021-04-03 NOTE — CHART NOTE - NSCHARTNOTEFT_GEN_A_CORE
Pt seen and examined at bedside.  No acute events overnight  Pt denies, palpitations, sob, abd pain, N/V, fever, chills  Pt with hx of bicuspid aortic valve presented with left sided chest pain at rest. Improved with Morphine, now states recurring pain  Hemodynamically stable, vitals stable  Reproducible left sided chest pain with palpation    EKG NSR  Trops negative x 2    Pain likely musculoskeletal etiology vs ACS or other concerning causes   Discussed case with Cardiology, Dr. Sexton  Trend trops/CE  Repeat EKG  ECHO  Rule out aortic dissection as pt with hx of Bicuspid aortic valve    If everything is negative, anticipate discharge home

## 2021-11-03 ENCOUNTER — TRANSCRIPTION ENCOUNTER (OUTPATIENT)
Age: 41
End: 2021-11-03

## 2022-07-14 NOTE — DISCHARGE NOTE PROVIDER - NSDCADMDATE_GEN_ALL_CORE_FT
[facility] - Periop Services  Discharge Note  Short Stay    Procedure(s) (LRB):  RELEASE, TRIGGER FINGER (Left)    OUTCOME: Patient tolerated treatment/procedure well without complication and is now ready for discharge.    DISPOSITION: Home or Self Care    FINAL DIAGNOSIS:  <principal problem not specified>    FOLLOWUP: In clinic    DISCHARGE INSTRUCTIONS:  No discharge procedures on file.     TIME SPENT ON DISCHARGE: 10 minutes  
03-Apr-2021 04:02

## 2022-08-07 ENCOUNTER — EMERGENCY (EMERGENCY)
Facility: HOSPITAL | Age: 42
LOS: 1 days | Discharge: HOME | End: 2022-08-07
Attending: STUDENT IN AN ORGANIZED HEALTH CARE EDUCATION/TRAINING PROGRAM | Admitting: STUDENT IN AN ORGANIZED HEALTH CARE EDUCATION/TRAINING PROGRAM
Payer: COMMERCIAL

## 2022-08-07 VITALS
DIASTOLIC BLOOD PRESSURE: 74 MMHG | OXYGEN SATURATION: 99 % | SYSTOLIC BLOOD PRESSURE: 117 MMHG | RESPIRATION RATE: 18 BRPM | HEART RATE: 67 BPM

## 2022-08-07 VITALS
TEMPERATURE: 98 F | WEIGHT: 225.09 LBS | OXYGEN SATURATION: 98 % | HEART RATE: 72 BPM | HEIGHT: 74 IN | RESPIRATION RATE: 21 BRPM | DIASTOLIC BLOOD PRESSURE: 75 MMHG | SYSTOLIC BLOOD PRESSURE: 114 MMHG

## 2022-08-07 PROBLEM — I35.0 NONRHEUMATIC AORTIC (VALVE) STENOSIS: Chronic | Status: ACTIVE | Noted: 2021-04-03

## 2022-08-07 PROCEDURE — 96374 THER/PROPH/DIAG INJ IV PUSH: CPT

## 2022-08-07 PROCEDURE — 99285 EMERGENCY DEPT VISIT HI MDM: CPT

## 2022-08-07 PROCEDURE — 96375 TX/PRO/DX INJ NEW DRUG ADDON: CPT

## 2022-08-07 PROCEDURE — 99284 EMERGENCY DEPT VISIT MOD MDM: CPT | Mod: 25

## 2022-08-07 RX ORDER — EPINEPHRINE 0.3 MG/.3ML
0.3 INJECTION INTRAMUSCULAR; SUBCUTANEOUS ONCE
Refills: 0 | Status: COMPLETED | OUTPATIENT
Start: 2022-08-07 | End: 2022-08-07

## 2022-08-07 RX ORDER — EPINEPHRINE 0.3 MG/.3ML
0.3 INJECTION INTRAMUSCULAR; SUBCUTANEOUS
Qty: 1 | Refills: 0
Start: 2022-08-07

## 2022-08-07 RX ORDER — DIPHENHYDRAMINE HCL 50 MG
50 CAPSULE ORAL ONCE
Refills: 0 | Status: COMPLETED | OUTPATIENT
Start: 2022-08-07 | End: 2022-08-07

## 2022-08-07 RX ORDER — FAMOTIDINE 10 MG/ML
40 INJECTION INTRAVENOUS ONCE
Refills: 0 | Status: COMPLETED | OUTPATIENT
Start: 2022-08-07 | End: 2022-08-07

## 2022-08-07 RX ORDER — FAMOTIDINE 10 MG/ML
80 INJECTION INTRAVENOUS ONCE
Refills: 0 | Status: DISCONTINUED | OUTPATIENT
Start: 2022-08-07 | End: 2022-08-07

## 2022-08-07 RX ORDER — DIPHENHYDRAMINE HCL 50 MG
50 CAPSULE ORAL ONCE
Refills: 0 | Status: DISCONTINUED | OUTPATIENT
Start: 2022-08-07 | End: 2022-08-07

## 2022-08-07 RX ADMIN — Medication 125 MILLIGRAM(S): at 06:15

## 2022-08-07 RX ADMIN — Medication 50 MILLIGRAM(S): at 06:15

## 2022-08-07 RX ADMIN — FAMOTIDINE 100 MILLIGRAM(S): 10 INJECTION INTRAVENOUS at 06:56

## 2022-08-07 NOTE — ED PROVIDER NOTE - CLINICAL SUMMARY MEDICAL DECISION MAKING FREE TEXT BOX
40 yo M hx of bicuspid aortic valve, no other medical problems, pw face swelling. Pt sprayed Dermoplast pain and burn and itch spray on his arm for pain relief after moving all day and went to sleep at 10:30pm at his baseline. At 5AM patient woke up with face swelling, redness and itchiness. No sob, throat tightness. See having full conversation with wife at bedside, vitals stable. ddx includes, but is not limited to the following: allergic reaction. No second symptoms other than erythema, not anaphylaxis. allergic reaction cocktail, monitor in the ED. Anticipate DC home with prednisone.

## 2022-08-07 NOTE — ED PROVIDER NOTE - OBJECTIVE STATEMENT
40 yo M hx of bicuspid aortic valve. went to bed at 10:30pm with Dermoplast pain and burn and itch spray on arm. Woke up at 5Am with swelling or face. 42 yo M hx of bicuspid aortic valve, no other medical problems, pw face swelling. Pt sprayed Dermoplast pain and burn and itch spray on his arm for pain relief after moving all day and went to sleep at 10:30pm at his baseline. At 5AM patient woke up with face swelling, redness and itchiness. No sob, throat tightness. See having full conversation with wife at bedside, vitals stable.

## 2022-08-07 NOTE — ED ADULT NURSE NOTE - OBJECTIVE STATEMENT
Pt alert and oriented x4 complaining of facial swelling after accidently spraying dermoplast on eyes. Pt reports facial swelling. Denies dyspnea and chest pain. Medications administered. As per night staff refused EPI. MD aware.

## 2022-08-07 NOTE — ED ADULT TRIAGE NOTE - CHIEF COMPLAINT QUOTE
pt had  right  arm  pain   and  sprayed dermoplast    he  feels  he  got  an allergic reaction  to the spray pt  presents with  swollen  face no breathing  difficulty  but  has  cough

## 2022-08-07 NOTE — ED PROVIDER NOTE - PATIENT PORTAL LINK FT
You can access the FollowMyHealth Patient Portal offered by [facility] by registering at the following website: [email or web address]. By joining [employer]’s FollowMyHealth portal, you will also be able to view your health information using other applications (apps) compatible with our system.

## 2022-08-07 NOTE — ED PROVIDER NOTE - NSFOLLOWUPINSTRUCTIONS_ED_ALL_ED_FT
Take prednisone 40mg by mouth daily, start on 8/8/22  Take benadryl 50mg my mouth every 6 hours as needed, do not drive  Use epipen if tongue swelling, lip swelling, trouble breathing  Return to ER if tongue swelling, lip swelling, trouble breathing, chest pain, or other symptoms    Anaphylactic Reaction, Adult      An anaphylactic reaction (anaphylaxis) is a sudden, very bad allergic reaction. This affects more than one part of your body. It can be life-threatening. If you have a very bad reaction, you need to get medical help right away.      What are the causes?    This condition is caused by exposure to things that give you an allergic reaction (allergens). Common allergens include:  •Foods, such as peanuts, wheat, shellfish, milk, and eggs.      •Medicines.      •Insect bites or stings.      •Blood or parts of blood that are given for treatment (transfusions).      •Chemicals, such as latex and dyes that are used in food and in medical tests.        What are the signs or symptoms?    Signs of a very bad allergic reaction may include:  •Feeling warm in the face (flushed). Your face may turn red.      •Itchy, red, or swollen areas of skin (hives).    •Swelling of:  •The eyes or face.      •The lips, tongue, or mouth.      •The throat.      •Trouble with any of these:  •Breathing.      •Talking.      •Swallowing.        •Feeling dizzy, light-headed, or like you might faint.      •Pain or cramps in your belly.      •Vomiting.      •Watery poop (diarrhea).        How is this treated?  A person using an auto-injector pen in the thigh.   If you think you are having a very bad allergic reaction, you should do this right away:  •Give yourself a shot of medicine (epinephrine) using an auto-injector "pen." Your doctor will teach you how to use this pen.    •Call for emergency help. If you use a pen, you must still get treated in the hospital. There, you may be given:  •Medicines.      •Oxygen.      •Fluids in an IV tube.          Follow these instructions at home:    Safety     •Always keep an auto-injector pen with you. This could save your life. If you can, carry two auto-injector pens. Use the pen as told by your doctor.      • Do not drive after a reaction. Wait until your doctor says it is safe to drive.    •Make sure that you, the people who live with you, and your employer know:  •What you are allergic to, so you can stay away from it.      •How to use your auto-injector pen.        •Wear a bracelet or necklace that says you have an allergy, if your doctor tells you to do this.      •Learn the signs of a very bad allergic reaction. This way, you can treat it right away.      •Work with your doctors to make a plan for what to do if you have a very bad reaction. It is important to be ready.      If you use your auto-injector pen:     •Get more medicine (epinephrine) for your pen right away. This is important in case you have another reaction.      •Get help right away.      General instructions     •Tell all doctors who care for you that you have an allergy.    •If you have itchy, red, swollen areas of skin or a rash:  •Use an over-the-counter medicine (antihistamine) as told by your doctor.      •Put cold, wet cloths on your skin.      •Take a cool bath or shower. Avoid hot water.        •Take over-the-counter and prescription medicines only as told by your doctor.      •Keep all follow-up visits as told by your doctor.        How is this prevented?    •Avoid things that gave you a very bad allergic reaction before.      •Tell your  about your allergy when you go out to eat. If you are not sure if your meal has food that you are allergic to, ask your  before you eat it.        Get help right away if:    •You have signs of an allergic reaction. You may notice them soon after being exposed to things that give you an allergic reaction.      •You had to use your auto-injector pen. You must go to the emergency room even if the medicine seems to be working. This is because another allergic reaction may happen within 3 days (rebound anaphylaxis).      These symptoms may be an emergency. Do not wait to see if the symptoms will go away. Do this right away:   • Use your auto-injector pen as you have been told.        • Get help. Call your local emergency services (911 in the [place] Do not drive yourself to the hospital.         Summary    •An anaphylactic reaction (anaphylaxis) is a sudden, serious allergic reaction.      •This condition can be life-threatening. If you have a reaction, get medical help right away.      •Your doctor will show you how to give yourself a shot (epinephrine injection) with an auto-injector "pen."      •Always keep an auto-injector pen with you. It could save your life. Use it as told by your doctor.      •If you had to use your auto-injector pen, you must still get treated in the hospital.

## 2022-08-07 NOTE — ED PROVIDER NOTE - PROGRESS NOTE DETAILS
pt states feeling much better, speaking in full sentences, no tongue or uvula swelling, no pooling of secretions, denies cp or sob. ctab  mild eyelid edema  will observe until 9am no swelling, lungs ctab  stable for d/c

## 2022-08-07 NOTE — ED PROVIDER NOTE - PHYSICAL EXAMINATION
GEN: Patient awake alert NAD.   HEENT: normocephalic, atraumatic, EOMI, + periorbital edema, no tongue swelling, no edema in posterior pharynx  CARDIAC: RRR, S1, S2, no murmur.   PULM: CTA B/L no wheeze, rhonchi, rales.   ABD: soft NT  MSK: Moving all extremities, 5/5 strength and full ROM in all extremities.     NEURO: A&Ox3, gait normal, no focal neurological deficits, CN 2-12 grossly intact  SKIN: + midface erythema, no hives. warm, dry, no rash.

## 2022-10-05 ENCOUNTER — EMERGENCY (EMERGENCY)
Facility: HOSPITAL | Age: 42
LOS: 1 days | Discharge: HOME | End: 2022-10-05
Attending: EMERGENCY MEDICINE | Admitting: EMERGENCY MEDICINE
Payer: COMMERCIAL

## 2022-10-05 VITALS
DIASTOLIC BLOOD PRESSURE: 97 MMHG | HEIGHT: 74 IN | RESPIRATION RATE: 21 BRPM | SYSTOLIC BLOOD PRESSURE: 176 MMHG | TEMPERATURE: 99 F | WEIGHT: 225.09 LBS | HEART RATE: 87 BPM | OXYGEN SATURATION: 100 %

## 2022-10-05 LAB
ALBUMIN SERPL ELPH-MCNC: 3.9 G/DL — SIGNIFICANT CHANGE UP (ref 3.3–5)
ALP SERPL-CCNC: 92 U/L — SIGNIFICANT CHANGE UP (ref 40–120)
ALT FLD-CCNC: 76 U/L — HIGH (ref 10–45)
ANION GAP SERPL CALC-SCNC: 11 MMOL/L — SIGNIFICANT CHANGE UP (ref 5–17)
APPEARANCE UR: CLEAR — SIGNIFICANT CHANGE UP
AST SERPL-CCNC: 23 U/L — SIGNIFICANT CHANGE UP (ref 10–40)
BASOPHILS # BLD AUTO: 0 K/UL — SIGNIFICANT CHANGE UP (ref 0–0.2)
BASOPHILS NFR BLD AUTO: 0 % — SIGNIFICANT CHANGE UP (ref 0–2)
BILIRUB SERPL-MCNC: 0.4 MG/DL — SIGNIFICANT CHANGE UP (ref 0.2–1.2)
BILIRUB UR-MCNC: NEGATIVE — SIGNIFICANT CHANGE UP
BUN SERPL-MCNC: 19 MG/DL — SIGNIFICANT CHANGE UP (ref 7–23)
CALCIUM SERPL-MCNC: 9 MG/DL — SIGNIFICANT CHANGE UP (ref 8.4–10.5)
CHLORIDE SERPL-SCNC: 102 MMOL/L — SIGNIFICANT CHANGE UP (ref 96–108)
CO2 SERPL-SCNC: 25 MMOL/L — SIGNIFICANT CHANGE UP (ref 22–31)
COLOR SPEC: YELLOW — SIGNIFICANT CHANGE UP
CREAT SERPL-MCNC: 1.4 MG/DL — HIGH (ref 0.5–1.3)
DIFF PNL FLD: ABNORMAL
EGFR: 64 ML/MIN/1.73M2 — SIGNIFICANT CHANGE UP
EOSINOPHIL # BLD AUTO: 0.16 K/UL — SIGNIFICANT CHANGE UP (ref 0–0.5)
EOSINOPHIL NFR BLD AUTO: 1 % — SIGNIFICANT CHANGE UP (ref 0–6)
GLUCOSE SERPL-MCNC: 143 MG/DL — HIGH (ref 70–99)
GLUCOSE UR QL: NEGATIVE — SIGNIFICANT CHANGE UP
HCT VFR BLD CALC: 46.2 % — SIGNIFICANT CHANGE UP (ref 39–50)
HGB BLD-MCNC: 16 G/DL — SIGNIFICANT CHANGE UP (ref 13–17)
KETONES UR-MCNC: NEGATIVE — SIGNIFICANT CHANGE UP
LEUKOCYTE ESTERASE UR-ACNC: NEGATIVE — SIGNIFICANT CHANGE UP
LIDOCAIN IGE QN: 98 U/L — SIGNIFICANT CHANGE UP (ref 73–393)
LYMPHOCYTES # BLD AUTO: 48 % — HIGH (ref 13–44)
LYMPHOCYTES # BLD AUTO: 7.62 K/UL — HIGH (ref 1–3.3)
MCHC RBC-ENTMCNC: 30.2 PG — SIGNIFICANT CHANGE UP (ref 27–34)
MCHC RBC-ENTMCNC: 34.6 GM/DL — SIGNIFICANT CHANGE UP (ref 32–36)
MCV RBC AUTO: 87.2 FL — SIGNIFICANT CHANGE UP (ref 80–100)
MONOCYTES # BLD AUTO: 1.43 K/UL — HIGH (ref 0–0.9)
MONOCYTES NFR BLD AUTO: 9 % — SIGNIFICANT CHANGE UP (ref 2–14)
NEUTROPHILS # BLD AUTO: 6.67 K/UL — SIGNIFICANT CHANGE UP (ref 1.8–7.4)
NEUTROPHILS NFR BLD AUTO: 42 % — LOW (ref 43–77)
NITRITE UR-MCNC: NEGATIVE — SIGNIFICANT CHANGE UP
PH UR: 6 — SIGNIFICANT CHANGE UP (ref 5–8)
PLATELET # BLD AUTO: 351 K/UL — SIGNIFICANT CHANGE UP (ref 150–400)
POTASSIUM SERPL-MCNC: 3.4 MMOL/L — LOW (ref 3.5–5.3)
POTASSIUM SERPL-SCNC: 3.4 MMOL/L — LOW (ref 3.5–5.3)
PROT SERPL-MCNC: 7.6 G/DL — SIGNIFICANT CHANGE UP (ref 6–8.3)
PROT UR-MCNC: 15
RBC # BLD: 5.3 M/UL — SIGNIFICANT CHANGE UP (ref 4.2–5.8)
RBC # FLD: 12.5 % — SIGNIFICANT CHANGE UP (ref 10.3–14.5)
SODIUM SERPL-SCNC: 138 MMOL/L — SIGNIFICANT CHANGE UP (ref 135–145)
SP GR SPEC: 1.02 — SIGNIFICANT CHANGE UP (ref 1.01–1.02)
UROBILINOGEN FLD QL: NEGATIVE — SIGNIFICANT CHANGE UP
WBC # BLD: 15.88 K/UL — HIGH (ref 3.8–10.5)
WBC # FLD AUTO: 15.88 K/UL — HIGH (ref 3.8–10.5)

## 2022-10-05 PROCEDURE — 96374 THER/PROPH/DIAG INJ IV PUSH: CPT

## 2022-10-05 PROCEDURE — 99285 EMERGENCY DEPT VISIT HI MDM: CPT

## 2022-10-05 PROCEDURE — 96375 TX/PRO/DX INJ NEW DRUG ADDON: CPT

## 2022-10-05 PROCEDURE — 74176 CT ABD & PELVIS W/O CONTRAST: CPT | Mod: 26,MA

## 2022-10-05 PROCEDURE — 81001 URINALYSIS AUTO W/SCOPE: CPT

## 2022-10-05 PROCEDURE — 85025 COMPLETE CBC W/AUTO DIFF WBC: CPT

## 2022-10-05 PROCEDURE — 96361 HYDRATE IV INFUSION ADD-ON: CPT

## 2022-10-05 PROCEDURE — 83690 ASSAY OF LIPASE: CPT

## 2022-10-05 PROCEDURE — 74176 CT ABD & PELVIS W/O CONTRAST: CPT | Mod: MA

## 2022-10-05 PROCEDURE — 96376 TX/PRO/DX INJ SAME DRUG ADON: CPT

## 2022-10-05 PROCEDURE — 87086 URINE CULTURE/COLONY COUNT: CPT

## 2022-10-05 PROCEDURE — 99284 EMERGENCY DEPT VISIT MOD MDM: CPT | Mod: 25

## 2022-10-05 PROCEDURE — 36415 COLL VENOUS BLD VENIPUNCTURE: CPT

## 2022-10-05 PROCEDURE — 80053 COMPREHEN METABOLIC PANEL: CPT

## 2022-10-05 RX ORDER — MORPHINE SULFATE 50 MG/1
4 CAPSULE, EXTENDED RELEASE ORAL ONCE
Refills: 0 | Status: DISCONTINUED | OUTPATIENT
Start: 2022-10-05 | End: 2022-10-05

## 2022-10-05 RX ORDER — HYDROMORPHONE HYDROCHLORIDE 2 MG/ML
1 INJECTION INTRAMUSCULAR; INTRAVENOUS; SUBCUTANEOUS ONCE
Refills: 0 | Status: DISCONTINUED | OUTPATIENT
Start: 2022-10-05 | End: 2022-10-05

## 2022-10-05 RX ORDER — TAMSULOSIN HYDROCHLORIDE 0.4 MG/1
1 CAPSULE ORAL
Qty: 20 | Refills: 0
Start: 2022-10-05 | End: 2022-10-24

## 2022-10-05 RX ORDER — SODIUM CHLORIDE 9 MG/ML
1000 INJECTION INTRAMUSCULAR; INTRAVENOUS; SUBCUTANEOUS ONCE
Refills: 0 | Status: COMPLETED | OUTPATIENT
Start: 2022-10-05 | End: 2022-10-05

## 2022-10-05 RX ORDER — ONDANSETRON 8 MG/1
1 TABLET, FILM COATED ORAL
Qty: 20 | Refills: 0
Start: 2022-10-05

## 2022-10-05 RX ORDER — KETOROLAC TROMETHAMINE 30 MG/ML
30 SYRINGE (ML) INJECTION ONCE
Refills: 0 | Status: DISCONTINUED | OUTPATIENT
Start: 2022-10-05 | End: 2022-10-05

## 2022-10-05 RX ORDER — ONDANSETRON 8 MG/1
4 TABLET, FILM COATED ORAL ONCE
Refills: 0 | Status: COMPLETED | OUTPATIENT
Start: 2022-10-05 | End: 2022-10-05

## 2022-10-05 RX ORDER — IBUPROFEN 200 MG
1 TABLET ORAL
Qty: 30 | Refills: 0
Start: 2022-10-05

## 2022-10-05 RX ADMIN — MORPHINE SULFATE 4 MILLIGRAM(S): 50 CAPSULE, EXTENDED RELEASE ORAL at 21:35

## 2022-10-05 RX ADMIN — HYDROMORPHONE HYDROCHLORIDE 1 MILLIGRAM(S): 2 INJECTION INTRAMUSCULAR; INTRAVENOUS; SUBCUTANEOUS at 20:37

## 2022-10-05 RX ADMIN — ONDANSETRON 4 MILLIGRAM(S): 8 TABLET, FILM COATED ORAL at 19:50

## 2022-10-05 RX ADMIN — SODIUM CHLORIDE 1000 MILLILITER(S): 9 INJECTION INTRAMUSCULAR; INTRAVENOUS; SUBCUTANEOUS at 23:19

## 2022-10-05 RX ADMIN — ONDANSETRON 4 MILLIGRAM(S): 8 TABLET, FILM COATED ORAL at 20:42

## 2022-10-05 RX ADMIN — Medication 30 MILLIGRAM(S): at 19:50

## 2022-10-05 RX ADMIN — Medication 30 MILLIGRAM(S): at 20:05

## 2022-10-05 RX ADMIN — MORPHINE SULFATE 4 MILLIGRAM(S): 50 CAPSULE, EXTENDED RELEASE ORAL at 21:50

## 2022-10-05 RX ADMIN — SODIUM CHLORIDE 1000 MILLILITER(S): 9 INJECTION INTRAMUSCULAR; INTRAVENOUS; SUBCUTANEOUS at 19:51

## 2022-10-05 RX ADMIN — SODIUM CHLORIDE 1000 MILLILITER(S): 9 INJECTION INTRAMUSCULAR; INTRAVENOUS; SUBCUTANEOUS at 20:51

## 2022-10-05 RX ADMIN — HYDROMORPHONE HYDROCHLORIDE 1 MILLIGRAM(S): 2 INJECTION INTRAMUSCULAR; INTRAVENOUS; SUBCUTANEOUS at 23:51

## 2022-10-05 RX ADMIN — HYDROMORPHONE HYDROCHLORIDE 1 MILLIGRAM(S): 2 INJECTION INTRAMUSCULAR; INTRAVENOUS; SUBCUTANEOUS at 20:52

## 2022-10-05 RX ADMIN — SODIUM CHLORIDE 1000 MILLILITER(S): 9 INJECTION INTRAMUSCULAR; INTRAVENOUS; SUBCUTANEOUS at 20:36

## 2022-10-05 RX ADMIN — SODIUM CHLORIDE 1000 MILLILITER(S): 9 INJECTION INTRAMUSCULAR; INTRAVENOUS; SUBCUTANEOUS at 21:36

## 2022-10-05 NOTE — ED ADULT TRIAGE NOTE - BRAND OF FIRST COVID-19 BOOSTER
During post op phone call, patient states that she had a lot of vomiting yesterday- q 15 min.  Patient took pepto bismol and ativan and finally achieved relief.  Patient thought she may have had a reaction to anesthesia and wanted to let anesthesia know.  Called Veena VALDOVINOS with anesthesia to relay information and she will follow up with patient.   [employer]

## 2022-10-05 NOTE — ED ADULT NURSE NOTE - OBJECTIVE STATEMENT
Pt c/o sudden onset left sided abdominal/flank pain that began just PTA. pt also began vomiting on arrival. Denies any fevers/chills, diarrhea. pt states he has been having trouble urinating. Last BM was yesterday. Denies any h/o kidney stones. PMH of aortic stenosis. Pt c/o sudden onset left sided abdominal/flank pain that began just PTA. pt also began vomiting on arrival. Denies any fevers/chills, diarrhea. pt states he has been having painful and difficulty urinating. Last BM was yesterday. Denies any h/o kidney stones. PMH of aortic stenosis.

## 2022-10-06 VITALS
RESPIRATION RATE: 16 BRPM | HEART RATE: 75 BPM | DIASTOLIC BLOOD PRESSURE: 74 MMHG | TEMPERATURE: 98 F | OXYGEN SATURATION: 95 % | SYSTOLIC BLOOD PRESSURE: 117 MMHG

## 2022-10-06 RX ADMIN — SODIUM CHLORIDE 1000 MILLILITER(S): 9 INJECTION INTRAMUSCULAR; INTRAVENOUS; SUBCUTANEOUS at 01:06

## 2022-10-06 RX ADMIN — HYDROMORPHONE HYDROCHLORIDE 1 MILLIGRAM(S): 2 INJECTION INTRAMUSCULAR; INTRAVENOUS; SUBCUTANEOUS at 01:16

## 2022-10-06 NOTE — ED PROVIDER NOTE - NSFOLLOWUPINSTRUCTIONS_ED_ALL_ED_FT
All of his lab had not resulted when the lab called is elevated K   His kidney function is elevated from his baseline.  Ask him overnight to drink plenty of water.   Hold his metformin and lasix     -take motrin /ibuprofen as needed for pain  -take percocet in addition for stronger pain  -take flomax daily  -take zofran (ondansetron) as needed for nausea    - see urologist for follow up this week  - return for uncontrolled pain, severe vomiting, fever, or other concerns    Renal Colic    WHAT YOU NEED TO KNOW:    Renal colic is severe pain in your lower back or sides. The pain is usually on one side, but may be on both sides of your lower back. Renal colic may start quickly, come and go, and become worse over time. Renal colic is caused by a blockage in your urinary tract. The most common cause of a blockage is a kidney stone. Blood clots, ureter spasms, and dead tissue may also block your urinary tract.    DISCHARGE INSTRUCTIONS:    Return to the emergency department if:     You cannot stop vomiting.      You see new or increased bleeding when you urinate.      You are urinating less than usual, or not at all.      Your pain is not getting better even after treatment.    Contact your healthcare provider if:     You have fever.      You need to urinate more often than usual, or right away.      You see a stone in your urine strainer after you urinate.      You have questions or concerns about your condition or care.    Medicines:     Medicines may help decrease pain and muscle spasms. You may also need medicine to calm your stomach and stop vomiting.      Take your medicine as directed. Contact your healthcare provider if you think your medicine is not helping or if you have side effects. Tell him of her if you are allergic to any medicine. Keep a list of the medicines, vitamins, and herbs you take. Include the amounts, and when and why you take them. Bring the list or the pill bottles to follow-up visits. Carry your medicine list with you in case of an emergency.    Manage your symptoms:     Drink liquids as directed to help decrease pain and flush blockages from your urinary tract. Ask how much liquid to drink each day and which liquids are best for you. You may need to drink about 3 liters (12 glasses) of liquids each day. Half of your total daily liquids should be water. Limit coffee, tea, and soda to 2 cups daily. Your urine should be pale and clear.      Strain your urine every time you urinate. Urinate into a strainer (funnel with a fine mesh on the bottom) or glass jar to collect kidney stones. Give the kidney stones to your healthcare provider at your next visit.Look for Stones in the Filter           Eat a variety of healthy foods. Healthy foods include fruits, vegetables, whole-grain breads, low-fat dairy products, beans, lean meats, and fish. You may need to increase the amount of citrus fruit you eat, such as oranges. Ask your healthcare provider how much salt, calcium, and protein you should eat.      Avoid activity in hot temperatures. Heat may cause you to become dehydrated and urinate less.    Follow up with your healthcare provider as directed: You may need to return for tests to check if your blockage has cleared. Write down your questions so you remember to ask them during your visits

## 2022-10-06 NOTE — ED PROVIDER NOTE - CARE PROVIDER_API CALL
Syed Long  UROLOGY  [address] [place] [address], [address]  [place], [place] [id number]  Phone: [phone]  Fax: [phone]  Follow Up Time: 1-3 Days

## 2022-10-06 NOTE — ED PROVIDER NOTE - OBJECTIVE STATEMENT
pt c/o LLQ abd pain, sharp, severe, acute onset about 20min pta today. started as he was urinating. no dysuria/hematuria. no fever/chill. assoc c nausea.

## 2022-10-06 NOTE — ED PROVIDER NOTE - PATIENT PORTAL LINK FT
You can access the FollowMyHealth Patient Portal offered by [facility] by registering at the following website: [email or web address]. By joining [employer]’s FollowMyHealth portal, you will also be able to view your health information using other applications (apps) compatible with our system.

## 2022-10-06 NOTE — ED PROVIDER NOTE - CLINICAL SUMMARY MEDICAL DECISION MAKING FREE TEXT BOX
pt c acute LLQ pain while urinating. most c/w L ureteral stone/colic. give iv analgesics, zofran, fluids. check ct abd. labs, ua/ucx. r/o renal failure, uti    labs ua ok. ct shows L uvj stone. pain finally controlled after multiple doses analgesics. f/u urology

## 2022-10-07 LAB
CULTURE RESULTS: SIGNIFICANT CHANGE UP
SPECIMEN SOURCE: SIGNIFICANT CHANGE UP

## 2022-11-01 NOTE — ED PROVIDER NOTE - GASTROINTESTINAL, MLM
[facility] for Melissa Woodard we do not have direction for patients Lantus  Abdomen soft, non-tender, no guarding.

## 2022-12-07 ENCOUNTER — NON-APPOINTMENT (OUTPATIENT)
Age: 42
End: 2022-12-07

## 2022-12-21 ENCOUNTER — APPOINTMENT (OUTPATIENT)
Dept: CT IMAGING | Facility: HOSPITAL | Age: 42
End: 2022-12-21

## 2025-01-21 NOTE — ED ADULT TRIAGE NOTE - CCCP TRG CHIEF CMPLNT
----- Message from Veronica Raya PA-C sent at 1/21/2025  6:13 AM CST -----  Regarding: FW: Notification of Unviewed Test Results  Pt has not reviewed message, pls call with result note. Thanks! Veronica    ----- Message -----  From: Patient Portal,   Sent: 1/10/2025   6:03 AM CST  To: Veronica Raya PA-C  Subject: Notification of Unviewed Test Results            CHETNA FERNANDEZ has not viewed the following results:  - PSA   allergic reaction